# Patient Record
Sex: FEMALE | Race: WHITE
[De-identification: names, ages, dates, MRNs, and addresses within clinical notes are randomized per-mention and may not be internally consistent; named-entity substitution may affect disease eponyms.]

---

## 2018-05-23 ENCOUNTER — HOSPITAL ENCOUNTER (EMERGENCY)
Dept: HOSPITAL 17 - NEPK | Age: 53
Discharge: HOME | End: 2018-05-23
Payer: SELF-PAY

## 2018-05-23 VITALS — BODY MASS INDEX: 16.65 KG/M2 | HEIGHT: 69 IN | WEIGHT: 112.44 LBS

## 2018-05-23 VITALS
OXYGEN SATURATION: 94 % | DIASTOLIC BLOOD PRESSURE: 54 MMHG | TEMPERATURE: 99.1 F | RESPIRATION RATE: 16 BRPM | SYSTOLIC BLOOD PRESSURE: 110 MMHG | HEART RATE: 87 BPM

## 2018-05-23 DIAGNOSIS — F17.200: ICD-10-CM

## 2018-05-23 DIAGNOSIS — J06.9: Primary | ICD-10-CM

## 2018-05-23 PROCEDURE — 71046 X-RAY EXAM CHEST 2 VIEWS: CPT

## 2018-05-23 PROCEDURE — 87081 CULTURE SCREEN ONLY: CPT

## 2018-05-23 PROCEDURE — 99284 EMERGENCY DEPT VISIT MOD MDM: CPT

## 2018-05-23 PROCEDURE — 87880 STREP A ASSAY W/OPTIC: CPT

## 2018-05-23 NOTE — RADRPT
EXAM DATE:  5/23/2018 3:24 PM EDT

AGE/SEX:        53 years / Female



INDICATIONS:  Cough and flu like symptoms.



CLINICAL DATA:  This is the patient's initial encounter. Patient reports that signs and symptoms have
 been present for 3 days and indicates a pain score of 3/10. 

                                                                          

MEDICAL/SURGICAL HISTORY:       None. Defibrillator.  Hysterectomy.



COMPARISON:      No prior [facility] exams available for comparison.



FINDINGS:  

PA and lateral views of the chest demonstrate the lungs to be symmetrically aerated without evidence 
of mass, infiltrate or effusion. There is hyperaeration bilaterally. The cardiomediastinal contours a
re unremarkable. Osseous structures are intact. There is a pacemaker overlying the left chest. Bilate
ral breast implants are in place.



CONCLUSION: 

1.  Hyperaeration characteristic of COPD.

2.  No acute intrathoracic disease.



Electronically signed by: Jani Harris MD  5/23/2018 3:27 PM EDT

## 2018-05-23 NOTE — PD
HPI


Chief Complaint:  Cold / Flu Symptoms


Time Seen by Provider:  14:47


Travel History


International Travel<30 days:  No


Contact w/Intl Traveler<30days:  No


Traveled to known affect area:  No





History of Present Illness


HPI


53-year-old female presents to the emergency department for evaluation of cold 

symptoms for 3 days.  Patient reports sore throat, coughing, congestion.  

Patient denies any history of asthma, COPD, pneumonia.  Patient denies any 

fevers or chills.  No chest pain.  No abdominal pain.  She reports decreased 

appetite.  No vomiting.  No diarrhea.  No exacerbating or alleviating factors.  

Mild severity.





PFSH


Past Medical History


Bipolar Disorder:  Yes


Anxiety:  Yes


Cardiovascular Problems:  Yes


Diminished Hearing:  No


Hypertension:  Yes


Implanted Vascular Access Dvce:  Yes ([employer] - [employer])


Psychiatric:  Yes


Menopausal:  Yes





Past Surgical History


Abdominal Surgery:  Yes (HX SBO)


AICD:  Yes


Cardiac Surgery:  Yes (AICD PLACED 08/2007)


Hysterectomy:  Yes





Social History


Alcohol Use:  No (PT DENIES)


Tobacco Use:  Yes (ONE PPD)


Substance Use:  No





Allergies-Medications


(Allergen,Severity, Reaction):  


Coded Allergies:  


     trazodone (Unverified  Allergy, Severe, SVT, 8/15/17)


     acetaminophen (Unverified  Allergy, Mild, UNKNOWN, 8/15/17)


     paroxetine (Unverified  Allergy, Mild, UNKNOWN, 8/15/17)


     propoxyphene (Unverified  Allergy, Mild, UNKNOWN, 8/15/17)


Reported Meds & Prescriptions





Reported Meds & Active Scripts


Active


Risperdal (Risperidone) 2 Mg Tab 2 Mg PO BID 30 Days


Reported


Remeron (Mirtazapine) 15 Mg Tab 7.5 Mg PO HS








Review of Systems


Except as stated in HPI:  all other systems reviewed are Neg





Physical Exam


Narrative


GENERAL: Well-nourished, well-developed female patient, afebrile


SKIN: Focused skin assessment warm/dry.


HEAD: Normocephalic.  Atraumatic


ENT: Mucosa pink and moist. No erythema or exudates. No uvular edema. No uvular

, palatal, or tonsillar deviation.  


Airway patent. Nasal turbinates appear normal without nasal blood, purulent 

drainage or septal hematoma.  Bilateral tympanic membranes clear without 

erythema or perforation.


EYES: No scleral icterus. No injection or drainage. 


NECK: Supple, trachea midline. No JVD or lymphadenopathy.


CARDIOVASCULAR: Regular rate and rhythm without murmurs, gallops, or rubs. 


RESPIRATORY: Breath sounds equal bilaterally. No accessory muscle use.  Lung 

sounds are clear to auscultation.  Dry cough noted


GASTROINTESTINAL: Abdomen soft, non-tender, nondistended. 


MUSCULOSKELETAL: No cyanosis, or edema. 


BACK: Nontender without obvious deformity. No CVA tenderness.





Data


Data


Last Documented VS





Vital Signs








  Date Time  Temp Pulse Resp B/P (MAP) Pulse Ox O2 Delivery O2 Flow Rate FiO2


 


5/23/18 14:41 99.1 87 16 110/54 (72) 94   








Orders





 Orders


Chest, Pa & Lat (5/23/18 )


Group A Rapid Strep Screen (5/23/18 14:57)


Strep Culture (Group A) (5/23/18 15:00)








MDM


Medical Decision Making


Medical Screen Exam Complete:  Yes


Emergency Medical Condition:  Yes


Medical Record Reviewed:  Yes


Interpretation(s)





Last Impressions








Chest X-Ray 5/23/18 0000 Signed





Impressions: 





 CONCLUSION: 





 1.  Hyperaeration characteristic of COPD.





 2.  No acute intrathoracic disease.





  





 





 Electronically signed by: Jani Harris MD  5/23/2018 3:27 PM EDT








Differential Diagnosis


Bronchitis versus URI versus pneumonia versus COPD exacerbation


Narrative Course


53-year-old female presents to the emergency department for evaluation of cold 

symptoms for 3 days.  Chest x-ray and strep swab are ordered and pending.





Chest x-ray shows Hyperaeration characteristic of COPD; No acute intrathoracic 

disease.  Strep is negative





Patient will be started on azithromycin, benzonatate capsules for URI.  She 

will be started antibiotics due to COPD.  She verbalizes agreement to this.  

She is instructed to follow the primary care physician.  She is return here for 

any acute worsening of symptoms.  She is requesting a note for work.





The patient was discharged in stable condition with instructions, including 

return instructions and follow up instructions.





Diagnosis





 Primary Impression:  


 Upper respiratory infection, acute


Referrals:  


Primary Care Physician


call for appointment


Patient Instructions:  General Instructions, Upper Respiratory Infection (ED)


Departure Forms:  Tests/Procedures, Work Release   Enter return to work date:  

May 26, 2018





***Additional Instructions:  


Take antibiotic as directed until gone.


Take benzonatate capsules as directed as needed for cough.


Follow-up with a primary care physician.


Return to the emergency department for any acute worsening of symptoms.


***Med/Other Pt SpecificInfo:  Prescription(s) given


Scripts


Benzonatate (Benzonatate) 200 Mg Cap


200 MG PO TID Y for COUGH, #21 CAP 0 Refills


   Prov: Bia Márquez         5/23/18 


Azithromycin (Zithromax Z-Ted) 250 Mg Dspk


250 MG PO AS DIRECTED for Infection, #1 DSPK 0 Refills


   500 MG (2 tabs) day 1, then 1 tab days 2-5.


   Prov: Bia Márquez         5/23/18


Disposition:  01 DISCHARGE HOME


Condition:  Stable











Bia Márquez May 23, 2018 14:57